# Patient Record
Sex: MALE | Race: BLACK OR AFRICAN AMERICAN | NOT HISPANIC OR LATINO
[De-identification: names, ages, dates, MRNs, and addresses within clinical notes are randomized per-mention and may not be internally consistent; named-entity substitution may affect disease eponyms.]

---

## 2020-11-04 ENCOUNTER — TRANSCRIPTION ENCOUNTER (OUTPATIENT)
Age: 55
End: 2020-11-04

## 2024-02-09 PROBLEM — Z00.00 ENCOUNTER FOR PREVENTIVE HEALTH EXAMINATION: Status: ACTIVE | Noted: 2024-02-09

## 2024-02-16 ENCOUNTER — APPOINTMENT (OUTPATIENT)
Dept: ORTHOPEDIC SURGERY | Facility: CLINIC | Age: 59
End: 2024-02-16
Payer: MEDICAID

## 2024-02-16 VITALS — WEIGHT: 263 LBS | HEIGHT: 69 IN | BODY MASS INDEX: 38.95 KG/M2

## 2024-02-16 DIAGNOSIS — M19.011 PRIMARY OSTEOARTHRITIS, RIGHT SHOULDER: ICD-10-CM

## 2024-02-16 DIAGNOSIS — I10 ESSENTIAL (PRIMARY) HYPERTENSION: ICD-10-CM

## 2024-02-16 PROCEDURE — 99204 OFFICE O/P NEW MOD 45 MIN: CPT

## 2024-02-16 RX ORDER — MELOXICAM 15 MG/1
15 TABLET ORAL
Qty: 30 | Refills: 0 | Status: ACTIVE | COMMUNITY
Start: 2024-02-16 | End: 1900-01-01

## 2024-02-16 RX ORDER — LOSARTAN POTASSIUM 50 MG/1
50 TABLET, FILM COATED ORAL
Refills: 0 | Status: ACTIVE | COMMUNITY

## 2024-02-16 RX ORDER — AMLODIPINE BESYLATE 10 MG/1
10 TABLET ORAL
Refills: 0 | Status: ACTIVE | COMMUNITY

## 2024-02-16 NOTE — DISCUSSION/SUMMARY
[de-identified] : mri r shoulder - eval rct  mobic fu p mri  ----------------------------------------------------------------------------  All relevant imaging studies pertinent to today's visit, including x-rays, MRI's and/or other advanced imaging studies (CT/etc) were independently interpreted and reviewed with the patient as needed. Implications of the studies together with the patient's clinical picture were discussed to formulate a working diagnosis and management options were detailed.  The patient was advised of the diagnosis.  The natural history of the pathology was explained in full. All questions were answered.  The risks and benefits of conservative and interventional treatment alternatives were explained to the patient    -----------------------------------------------  The patient was advised that an advanced diagnostic/imaging study (MRI/CT/etc) will be ordered to evaluate potential pathology in the affected area(s). The patient was further advised to follow up in the office to review the results of the study and determine further management that may be indicated.   -----------------------------------------------  Patient warned of specific risks of medication related to bleeding, GI issues, increase blood pressure, and cardiac risks in addition to additional risks. Patient advised to discuss with PMD if any presence of stated issues.   Progress note completed by Elio Tavarez PA-C working as a scribe for Dr Taylor

## 2024-02-16 NOTE — IMAGING
[Right] : right shoulder [Outside films reviewed] : Outside films reviewed [There are no fractures, subluxations or dislocations. No significant abnormalities are seen] : There are no fractures, subluxations or dislocations. No significant abnormalities are seen [Type 2 acromion] : Type 2 acromion [AC Joint Arthrosis] : AC Joint Arthrosis [Glenohumeral arthritis] : Glenohumeral arthritis [de-identified] :   ----------------------------------------------------------------------------   Right shoulder exam:   Skin: no significant pertinent finding Inspection: no obvious deformity, no obvious masses, no swelling, no effusion, no atrophy ROM:    FF: 180p 130a    ER: 70    IR: T12 Tenderness:    (+) Anterior/Biceps:    (neg) Posterior    (+) Lateral    (+) Trapezius    (neg) Scapula    (neg) AC joint    (neg) Crepitus with ROM Stability:    (neg) Translation    (neg) Apprehension    (neg) Clicking Additional tests:  + drop arm    (+) Neer's    (+)Hawkin's    (neg) Moreland's    (neg) Speed    (neg) Cross chest adduction Strength:    FF: 3/5    ER: 5/5    IR: 5/5    Biceps: 5/5    Triceps: 5/5    Distal: 5/5 Neuro: In tact to light touch throughout Vascularity: Extremity warm and well perfused

## 2024-02-16 NOTE — HISTORY OF PRESENT ILLNESS
[7] : 7 [6] : 6 [Dull/Aching] : dull/aching [Sharp] : sharp [Intermittent] : intermittent [Leisure] : leisure [de-identified] : This is Mr. ANGELO Solano  a 58 year old male who comes in today complaining of right shoulder pain since December when he fell and braced his fall. Pain with overhead activity. Difficulty lifting. Feels weak. No radic sx. [] : no [FreeTextEntry1] : Right Shoulder [FreeTextEntry7] : Neck, chest [de-identified] : PCP [de-identified] : Xrays

## 2024-02-23 ENCOUNTER — APPOINTMENT (OUTPATIENT)
Dept: ORTHOPEDIC SURGERY | Facility: CLINIC | Age: 59
End: 2024-02-23
Payer: MEDICAID

## 2024-02-23 DIAGNOSIS — M75.41 IMPINGEMENT SYNDROME OF RIGHT SHOULDER: ICD-10-CM

## 2024-02-23 DIAGNOSIS — R29.898 OTHER SYMPTOMS AND SIGNS INVOLVING THE MUSCULOSKELETAL SYSTEM: ICD-10-CM

## 2024-02-23 PROCEDURE — 99213 OFFICE O/P EST LOW 20 MIN: CPT

## 2024-02-23 NOTE — HISTORY OF PRESENT ILLNESS
[7] : 7 [6] : 6 [Dull/Aching] : dull/aching [Sharp] : sharp [Intermittent] : intermittent [Leisure] : leisure [de-identified] : This is Mr. ANGELO Solano  a 58 year old male who comes in today complaining of right shoulder pain since December when he fell and braced his fall. Pain with overhead activity. Difficulty lifting. Feels weak. No radic sx. [] : no [FreeTextEntry1] : Right Shoulder [FreeTextEntry7] : Neck, chest [de-identified] : PCP [de-identified] : Xrays

## 2024-02-23 NOTE — IMAGING
[Outside films reviewed] : Outside films reviewed [Right] : right shoulder [There are no fractures, subluxations or dislocations. No significant abnormalities are seen] : There are no fractures, subluxations or dislocations. No significant abnormalities are seen [Type 2 acromion] : Type 2 acromion [AC Joint Arthrosis] : AC Joint Arthrosis [Glenohumeral arthritis] : Glenohumeral arthritis [de-identified] :   ----------------------------------------------------------------------------   Right shoulder exam:   Skin: no significant pertinent finding Inspection: no obvious deformity, no obvious masses, no swelling, no effusion, no atrophy ROM:    FF: 180p 130a    ER: 70    IR: T12 Tenderness:    (+) Anterior/Biceps:    (neg) Posterior    (+) Lateral    (+) Trapezius    (neg) Scapula    (neg) AC joint    (neg) Crepitus with ROM Stability:    (neg) Translation    (neg) Apprehension    (neg) Clicking Additional tests:  + drop arm    (+) Neer's    (+)Hawkin's    (neg) Moreland's    (neg) Speed    (neg) Cross chest adduction Strength:    FF: 3/5    ER: 5/5    IR: 5/5    Biceps: 5/5    Triceps: 5/5    Distal: 5/5 Neuro: In tact to light touch throughout Vascularity: Extremity warm and well perfused

## 2024-02-23 NOTE — DISCUSSION/SUMMARY
[de-identified] : continue to Rec mri r shoulder - eval rct  Patient indicated for MRI due to his persistent weakness following a traumatic injury. Concern for likely large rotator cuff tear, for which surgery may be indicated.  PT rx fu p mri  ----------------------------------------------------------------------------  All relevant imaging studies pertinent to today's visit, including x-rays, MRI's and/or other advanced imaging studies (CT/etc) were independently interpreted and reviewed with the patient as needed. Implications of the studies together with the patient's clinical picture were discussed to formulate a working diagnosis and management options were detailed.  The patient was advised of the diagnosis.  The natural history of the pathology was explained in full. All questions were answered.  The risks and benefits of conservative and interventional treatment alternatives were explained to the patient    -----------------------------------------------  The patient was advised that an advanced diagnostic/imaging study (MRI/CT/etc) will be ordered to evaluate potential pathology in the affected area(s). The patient was further advised to follow up in the office to review the results of the study and determine further management that may be indicated.   -----------------------------------------------  Patient warned of specific risks of medication related to bleeding, GI issues, increase blood pressure, and cardiac risks in addition to additional risks. Patient advised to discuss with PMD if any presence of stated issues.   Progress note completed by Elio Tavarez PA-C working as a scribe for Dr Taylor

## 2024-03-11 ENCOUNTER — APPOINTMENT (OUTPATIENT)
Dept: ORTHOPEDIC SURGERY | Facility: CLINIC | Age: 59
End: 2024-03-11
Payer: MEDICAID

## 2024-03-11 VITALS — HEIGHT: 69 IN | WEIGHT: 263 LBS | BODY MASS INDEX: 38.95 KG/M2

## 2024-03-11 DIAGNOSIS — S46.811A STRAIN OF OTHER MUSCLES, FASCIA AND TENDONS AT SHOULDER AND UPPER ARM LEVEL, RIGHT ARM, INITIAL ENCOUNTER: ICD-10-CM

## 2024-03-11 DIAGNOSIS — M75.101 UNSPECIFIED ROTATOR CUFF TEAR OR RUPTURE OF RIGHT SHOULDER, NOT SPECIFIED AS TRAUMATIC: ICD-10-CM

## 2024-03-11 DIAGNOSIS — M75.21 BICIPITAL TENDINITIS, RIGHT SHOULDER: ICD-10-CM

## 2024-03-11 DIAGNOSIS — M75.121 COMPLETE ROTATOR CUFF TEAR OR RUPTURE OF RIGHT SHOULDER, NOT SPECIFIED AS TRAUMATIC: ICD-10-CM

## 2024-03-11 PROCEDURE — 99214 OFFICE O/P EST MOD 30 MIN: CPT

## 2024-03-11 NOTE — DISCUSSION/SUMMARY
[de-identified] : Reviewed MRI, Discussed and recommend right shoulder arthroscopy Plan for right shoulder scope, RCR, GHD, SAD, subscap repair, biceps tenotomy, possible regeneten patch Discussed possible edumndo, discussed chronicity of tear likely acute on chronic, discussed possibility of failure to heal Discussed RBA ----------------------------------------------------------------------------  The patient was advised of the diagnosis.  The natural history of the pathology was explained in full to the patient. All questions were answered.  The risks and benefits of surgical and non-surgical treatment were explained in full to the patient.  The patient demonstrated a full understanding of the surgical and non-surgical options.  The risks of surgery were outlined in full to the patient including but not limited to pain, stiffness, bleeding, scarring, infection, neurologic injury, vascular injury, failure to resolve symptoms, symptom recurrence, the need for further surgery, non-healing, implant failure, intraoperative fracture, wound breakdown, deep vein thrombosis, pulmonary embolism, anesthesia complications and even death.  The patient understood all the risks and accepted them and understood that other complications could occur that are not mentioned above.  The intraoperative plan, post-operative plan, post-operative expectations and limitations were explained in full.  Importance of postoperative rehabilitation and restriction compliance was explained and emphasized. Expectations from non-surgical treatment were explained in full as well.  The patient demonstrated a complete understanding of the treatment detailed, the risks and alternatives.  ----------------------------------------------------------------------------  All relevant imaging studies pertinent to today's visit, including x-rays, MRI's and/or other advanced imaging studies (CT/etc) were independently interpreted and reviewed with the patient as needed. Implications of the studies together with the patient's clinical picture were discussed to formulate a working diagnosis and management options were detailed.  The patient was advised of the diagnosis.  The natural history of the pathology was explained in full. All questions were answered.  The risks and benefits of conservative and interventional treatment alternatives were explained to the patient

## 2024-03-11 NOTE — IMAGING
[Right] : right shoulder [There are no fractures, subluxations or dislocations. No significant abnormalities are seen] : There are no fractures, subluxations or dislocations. No significant abnormalities are seen [Outside films reviewed] : Outside films reviewed [Type 2 acromion] : Type 2 acromion [AC Joint Arthrosis] : AC Joint Arthrosis [Glenohumeral arthritis] : Glenohumeral arthritis [de-identified] :   ----------------------------------------------------------------------------   Right shoulder exam:   Skin: no significant pertinent finding Inspection: no obvious deformity, no obvious masses, no swelling, no effusion, no atrophy ROM:    FF: 180p 130a    ER: 70    IR: T12 Tenderness:    (+) Anterior/Biceps:    (neg) Posterior    (+) Lateral    (+) Trapezius    (neg) Scapula    (neg) AC joint    (neg) Crepitus with ROM Stability:    (neg) Translation    (neg) Apprehension    (neg) Clicking Additional tests:  + drop arm    (+) Neer's    (+)Hawkin's    (neg) Moreland's    (neg) Speed    (neg) Cross chest adduction Strength:    FF: 3/5    ER: 5/5    IR: 5/5    Biceps: 5/5    Triceps: 5/5    Distal: 5/5 Neuro: In tact to light touch throughout Vascularity: Extremity warm and well perfused

## 2024-03-11 NOTE — HISTORY OF PRESENT ILLNESS
[7] : 7 [6] : 6 [Dull/Aching] : dull/aching [Sharp] : sharp [Intermittent] : intermittent [Leisure] : leisure [de-identified] : This is Mr. ANGELO Solano  a 58 year old male who comes in today complaining of right shoulder pain since December when he fell and braced his fall. Pain with overhead activity. Difficulty lifting. Feels weak. No radic sx.  [] : no [FreeTextEntry7] : Neck, chest [FreeTextEntry1] : Right Shoulder [de-identified] : Xrays [de-identified] : PCP [de-identified] : PT/MRI

## 2024-03-11 NOTE — DATA REVIEWED
[Right] : of the right [MRI] : MRI [I independently reviewed and interpreted images and report] : I independently reviewed and interpreted images and report [Shoulder] : shoulder [FreeTextEntry1] : AC arthrosis, complete supraspinatus tear, retracted to mid humeral head, subscapularis tear, biceps subluxation, mild atrophy of suprapsinatus grade 2

## 2024-05-06 ENCOUNTER — APPOINTMENT (OUTPATIENT)
Dept: ORTHOPEDIC SURGERY | Facility: AMBULATORY SURGERY CENTER | Age: 59
End: 2024-05-06
Payer: MEDICAID

## 2024-05-06 PROCEDURE — 29827 SHO ARTHRS SRG RT8TR CUF RPR: CPT | Mod: RT

## 2024-05-06 PROCEDURE — 29823 SHO ARTHRS SRG XTNSV DBRDMT: CPT | Mod: 59,RT

## 2024-05-06 PROCEDURE — 29999 UNLISTED PX ARTHROSCOPY: CPT | Mod: AS,59,RT

## 2024-05-06 PROCEDURE — 29823 SHO ARTHRS SRG XTNSV DBRDMT: CPT | Mod: AS,59,RT

## 2024-05-06 PROCEDURE — 29826 SHO ARTHRS SRG DECOMPRESSION: CPT | Mod: RT

## 2024-05-06 PROCEDURE — 29999 UNLISTED PX ARTHROSCOPY: CPT | Mod: 59,RT

## 2024-05-06 PROCEDURE — 29826 SHO ARTHRS SRG DECOMPRESSION: CPT | Mod: AS,RT

## 2024-05-06 PROCEDURE — 29827 SHO ARTHRS SRG RT8TR CUF RPR: CPT | Mod: AS,RT

## 2024-05-17 ENCOUNTER — APPOINTMENT (OUTPATIENT)
Dept: ORTHOPEDIC SURGERY | Facility: CLINIC | Age: 59
End: 2024-05-17
Payer: MEDICAID

## 2024-05-17 PROCEDURE — 99024 POSTOP FOLLOW-UP VISIT: CPT

## 2024-05-17 RX ORDER — OXYCODONE AND ACETAMINOPHEN 5; 325 MG/1; MG/1
5-325 TABLET ORAL
Qty: 30 | Refills: 0 | Status: ACTIVE | COMMUNITY
Start: 2024-05-06 | End: 1900-01-01

## 2024-05-17 NOTE — HISTORY OF PRESENT ILLNESS
[7] : 7 [6] : 6 [Dull/Aching] : dull/aching [Sharp] : sharp [Intermittent] : intermittent [Leisure] : leisure [de-identified] : This is Mr. ANGELO Solano  a 58 year old male who comes in today complaining of right shoulder pain since December when he fell and braced his fall. Pain with overhead activity. Difficulty lifting. Feels weak. No radic sx.   5/6/24 S/P right shoulder arthroscopy RCR, SAD, bicep tenotomy and subscapularis repair [] : no [FreeTextEntry1] : Right Shoulder [FreeTextEntry7] : Neck, chest [de-identified] : PCP [de-identified] : Xrays [de-identified] : PT/MRI

## 2024-05-17 NOTE — DISCUSSION/SUMMARY
[de-identified] : PO protocol and activity modifications discussed Reviewed arthroscopic sx photos  Remain in sling  Will wait to start PT f/u in 2 weeks -----------------------------------------------------------------------------   All relevant imaging studies pertinent to today's visit, including x-rays, MRI's and/or other advanced imaging studies (CT/etc) were independently interpreted and reviewed with the patient as needed. Implications of the studies together with the patient's clinical picture were discussed to formulate a working diagnosis and management options were detailed.   The patient and/or guardian was advised of the diagnosis.  The natural history of the pathology was explained in full. All questions were answered.  The risks and benefits of conservative and interventional treatment alternatives were explained to the patient  The patient and/or guardian was advised if any advanced diagnostic/imaging study (MRI/CT/etc) is ordered to evaluate potential pathology in the affected area(s), they should follow up in the office to review the results of the study and determine further management that may be indicated.

## 2024-05-17 NOTE — IMAGING
[Right] : right shoulder [Outside films reviewed] : Outside films reviewed [There are no fractures, subluxations or dislocations. No significant abnormalities are seen] : There are no fractures, subluxations or dislocations. No significant abnormalities are seen [Type 2 acromion] : Type 2 acromion [AC Joint Arthrosis] : AC Joint Arthrosis [Glenohumeral arthritis] : Glenohumeral arthritis [de-identified] : Right shoulder exam: incision CDI mild effusion good rom NVI  sutures removed, steri strips applied

## 2024-05-17 NOTE — DATA REVIEWED
[MRI] : MRI [Right] : of the right [Shoulder] : shoulder [I independently reviewed and interpreted images and report] : I independently reviewed and interpreted images and report [FreeTextEntry1] : AC arthrosis, complete supraspinatus tear, retracted to mid humeral head, subscapularis tear, biceps subluxation, mild atrophy of suprapsinatus grade 2

## 2024-05-17 NOTE — REASON FOR VISIT
[FreeTextEntry2] : 1st po visit: right shoulder Pt has finished out his pain medication and switched to advil but still having discomfort but overall doing good. Has remained in sling

## 2024-05-31 ENCOUNTER — APPOINTMENT (OUTPATIENT)
Dept: ORTHOPEDIC SURGERY | Facility: CLINIC | Age: 59
End: 2024-05-31
Payer: MEDICAID

## 2024-05-31 VITALS — WEIGHT: 263 LBS | HEIGHT: 69 IN | BODY MASS INDEX: 38.95 KG/M2

## 2024-05-31 PROCEDURE — 99024 POSTOP FOLLOW-UP VISIT: CPT

## 2024-05-31 NOTE — REASON FOR VISIT
[FreeTextEntry2] : 2nd po visit: right shoulder- 4 weeks from sx,  pt has remained compliant with sling use.

## 2024-05-31 NOTE — IMAGING
[Right] : right shoulder [Outside films reviewed] : Outside films reviewed [There are no fractures, subluxations or dislocations. No significant abnormalities are seen] : There are no fractures, subluxations or dislocations. No significant abnormalities are seen [Type 2 acromion] : Type 2 acromion [AC Joint Arthrosis] : AC Joint Arthrosis [Glenohumeral arthritis] : Glenohumeral arthritis [de-identified] : Right shoulder exam: incision CDI mild effusion good rom NVI

## 2024-05-31 NOTE — DISCUSSION/SUMMARY
[de-identified] : PO protocol and activity modifications discussed Pt will remain in sling  Plan to start PT - dispensed protocol in office f/u in 4 weeks  -----------------------------------------------------------------------------   All relevant imaging studies pertinent to today's visit, including x-rays, MRI's and/or other advanced imaging studies (CT/etc) were independently interpreted and reviewed with the patient as needed. Implications of the studies together with the patient's clinical picture were discussed to formulate a working diagnosis and management options were detailed.   The patient and/or guardian was advised of the diagnosis.  The natural history of the pathology was explained in full. All questions were answered.  The risks and benefits of conservative and interventional treatment alternatives were explained to the patient  The patient and/or guardian was advised if any advanced diagnostic/imaging study (MRI/CT/etc) is ordered to evaluate potential pathology in the affected area(s), they should follow up in the office to review the results of the study and determine further management that may be indicated.

## 2024-05-31 NOTE — HISTORY OF PRESENT ILLNESS
[4] : 4 [Dull/Aching] : dull/aching [Sharp] : sharp [Intermittent] : intermittent [Leisure] : leisure [de-identified] : This is Mr. ANGELO Solano  a 58 year old male who comes in today complaining of right shoulder pain since December when he fell and braced his fall. Pain with overhead activity. Difficulty lifting. Feels weak. No radic sx.   5/6/24 S/P right shoulder arthroscopy RCR, SAD, bicep tenotomy and subscapularis repair [] : no [FreeTextEntry1] : Right Shoulder [FreeTextEntry7] : Neck, chest [de-identified] : PCP [de-identified] : Xrays [de-identified] : none [de-identified] : right shoulder

## 2024-06-05 ENCOUNTER — APPOINTMENT (OUTPATIENT)
Dept: ORTHOPEDIC SURGERY | Facility: CLINIC | Age: 59
End: 2024-06-05
Payer: MEDICAID

## 2024-06-05 VITALS — HEIGHT: 69 IN | WEIGHT: 263 LBS | BODY MASS INDEX: 38.95 KG/M2

## 2024-06-05 PROCEDURE — 99024 POSTOP FOLLOW-UP VISIT: CPT

## 2024-06-05 RX ORDER — CEPHALEXIN 500 MG/1
500 CAPSULE ORAL 4 TIMES DAILY
Qty: 28 | Refills: 0 | Status: ACTIVE | COMMUNITY
Start: 2024-06-05 | End: 1900-01-01

## 2024-06-05 NOTE — DISCUSSION/SUMMARY
[de-identified] : PO protocol and activity modifications discussed Wound appears to have superficial wound dehiscence plan for antibiotics , irrigation with saline, packing, and f/u in 3 days  Keflex 500 mg QID x 1 wk Pt will remain in sling  Continue PT per protocol f/u in 3 days  -----------------------------------------------------------------------------   All relevant imaging studies pertinent to today's visit, including x-rays, MRI's and/or other advanced imaging studies (CT/etc) were independently interpreted and reviewed with the patient as needed. Implications of the studies together with the patient's clinical picture were discussed to formulate a working diagnosis and management options were detailed.   The patient and/or guardian was advised of the diagnosis.  The natural history of the pathology was explained in full. All questions were answered.  The risks and benefits of conservative and interventional treatment alternatives were explained to the patient  The patient and/or guardian was advised if any advanced diagnostic/imaging study (MRI/CT/etc) is ordered to evaluate potential pathology in the affected area(s), they should follow up in the office to review the results of the study and determine further management that may be indicated.

## 2024-06-05 NOTE — IMAGING
[Right] : right shoulder [Outside films reviewed] : Outside films reviewed [There are no fractures, subluxations or dislocations. No significant abnormalities are seen] : There are no fractures, subluxations or dislocations. No significant abnormalities are seen [Type 2 acromion] : Type 2 acromion [AC Joint Arthrosis] : AC Joint Arthrosis [Glenohumeral arthritis] : Glenohumeral arthritis [de-identified] : Right shoulder exam: healed incisions except  small wound  dehiscence posterior portal , no surrounding erythema, no active drainage, unable to express any puss, wound irrigated with saline and betadine, plan for oral antibiotics min effusion minimal pain with ROM 0-90 degrees, no evidence of short arc pain,  NVI

## 2024-06-05 NOTE — REASON FOR VISIT
[FreeTextEntry2] : 3rd po visit: right shoulder- 4 weeks from sx, having issue with incision. noticed the posterior incision had a scab which fell off yesterday after PT and the one incision is not fully healed. noticed small drainage this morning. No prior drainage/ no drainage since. Feeling well otherwise, no F/C.

## 2024-06-05 NOTE — HISTORY OF PRESENT ILLNESS
[5] : 5 [4] : 4 [Dull/Aching] : dull/aching [Sharp] : sharp [Intermittent] : intermittent [Leisure] : leisure [de-identified] : This is Mr. ANGELO Solano  a 58 year old male who comes in today complaining of right shoulder pain since December when he fell and braced his fall. Pain with overhead activity. Difficulty lifting. Feels weak. No radic sx.   5/6/24 S/P right shoulder arthroscopy RCR, SAD, bicep tenotomy and subscapularis repair [] : no [FreeTextEntry1] : Right Shoulder [FreeTextEntry7] : Neck, chest [de-identified] : PCP [de-identified] : Xrays [de-identified] : physical therapy [de-identified] : right shoulder

## 2024-06-07 ENCOUNTER — APPOINTMENT (OUTPATIENT)
Dept: ORTHOPEDIC SURGERY | Facility: CLINIC | Age: 59
End: 2024-06-07
Payer: MEDICAID

## 2024-06-07 VITALS — BODY MASS INDEX: 38.95 KG/M2 | HEIGHT: 69 IN | WEIGHT: 263 LBS

## 2024-06-07 PROCEDURE — 99024 POSTOP FOLLOW-UP VISIT: CPT

## 2024-06-07 NOTE — HISTORY OF PRESENT ILLNESS
[5] : 5 [4] : 4 [Dull/Aching] : dull/aching [Sharp] : sharp [Intermittent] : intermittent [Leisure] : leisure [de-identified] : This is Mr. ANGELO Solano  a 58 year old male who comes in today complaining of right shoulder pain since December when he fell and braced his fall. Pain with overhead activity. Difficulty lifting. Feels weak. No radic sx.   5/6/24 S/P right shoulder arthroscopy RCR, SAD, bicep tenotomy and subscapularis repair [] : no [FreeTextEntry1] : Right Shoulder [FreeTextEntry7] : Neck, chest [de-identified] : PCP [de-identified] : Xrays [de-identified] : physical therapy [de-identified] : right shoulder

## 2024-06-07 NOTE — DISCUSSION/SUMMARY
[de-identified] : finish ABx dressing change bid  Pt will remain in sling  Continue PT per protocol f/u in 2wks, call if sx worsen  -----------------------------------------------------------------------------   All relevant imaging studies pertinent to today's visit, including x-rays, MRI's and/or other advanced imaging studies (CT/etc) were independently interpreted and reviewed with the patient as needed. Implications of the studies together with the patient's clinical picture were discussed to formulate a working diagnosis and management options were detailed.   The patient and/or guardian was advised of the diagnosis.  The natural history of the pathology was explained in full. All questions were answered.  The risks and benefits of conservative and interventional treatment alternatives were explained to the patient  The patient and/or guardian was advised if any advanced diagnostic/imaging study (MRI/CT/etc) is ordered to evaluate potential pathology in the affected area(s), they should follow up in the office to review the results of the study and determine further management that may be indicated.

## 2024-06-07 NOTE — IMAGING
[Right] : right shoulder [Outside films reviewed] : Outside films reviewed [There are no fractures, subluxations or dislocations. No significant abnormalities are seen] : There are no fractures, subluxations or dislocations. No significant abnormalities are seen [Type 2 acromion] : Type 2 acromion [AC Joint Arthrosis] : AC Joint Arthrosis [Glenohumeral arthritis] : Glenohumeral arthritis [de-identified] : Right shoulder exam: healed incisions except  small wound  dehiscence posterior portal , no surrounding erythema, no active drainage, unable to express any puss, wound irrigated with saline and betadine, plan for oral antibiotics min effusion minimal pain with ROM 0-90 degrees, no evidence of short arc pain,  NVI

## 2024-06-21 ENCOUNTER — APPOINTMENT (OUTPATIENT)
Dept: ORTHOPEDIC SURGERY | Facility: CLINIC | Age: 59
End: 2024-06-21
Payer: MEDICAID

## 2024-06-21 VITALS — WEIGHT: 263 LBS | BODY MASS INDEX: 38.95 KG/M2 | HEIGHT: 69 IN

## 2024-06-21 DIAGNOSIS — Z98.890 OTHER SPECIFIED POSTPROCEDURAL STATES: ICD-10-CM

## 2024-06-21 PROCEDURE — 99024 POSTOP FOLLOW-UP VISIT: CPT

## 2024-06-21 NOTE — HISTORY OF PRESENT ILLNESS
[3] : 3 [Dull/Aching] : dull/aching [Sharp] : sharp [Intermittent] : intermittent [Leisure] : leisure [de-identified] : This is Mr. ANGELO Solano  a 58 year old male who comes in today complaining of right shoulder pain since December when he fell and braced his fall. Pain with overhead activity. Difficulty lifting. Feels weak. No radic sx.   5/6/24 S/P right shoulder arthroscopy RCR, SAD, bicep tenotomy and subscapularis repair [] : no [FreeTextEntry1] : Right Shoulder [FreeTextEntry7] : Neck, chest [de-identified] : PCP [de-identified] : Xrays [de-identified] : physical therapy [de-identified] : right shoulder

## 2024-06-21 NOTE — DISCUSSION/SUMMARY
[de-identified] : Monitored incision and dressing sterilely changed in office  Advised on wound care and healing  Pt can slowly wean from sling and can discontinue use of sling next week Continue PT per protocol f/u 6 weeks   -----------------------------------------------------------------------------   All relevant imaging studies pertinent to today's visit, including x-rays, MRI's and/or other advanced imaging studies (CT/etc) were independently interpreted and reviewed with the patient as needed. Implications of the studies together with the patient's clinical picture were discussed to formulate a working diagnosis and management options were detailed.   The patient and/or guardian was advised of the diagnosis.  The natural history of the pathology was explained in full. All questions were answered.  The risks and benefits of conservative and interventional treatment alternatives were explained to the patient  The patient and/or guardian was advised if any advanced diagnostic/imaging study (MRI/CT/etc) is ordered to evaluate potential pathology in the affected area(s), they should follow up in the office to review the results of the study and determine further management that may be indicated.

## 2024-06-21 NOTE — IMAGING
[Right] : right shoulder [Outside films reviewed] : Outside films reviewed [There are no fractures, subluxations or dislocations. No significant abnormalities are seen] : There are no fractures, subluxations or dislocations. No significant abnormalities are seen [Type 2 acromion] : Type 2 acromion [AC Joint Arthrosis] : AC Joint Arthrosis [Glenohumeral arthritis] : Glenohumeral arthritis [de-identified] : Right shoulder exam: small opening decreased from previous visit  clean and no fluid extravagation min effusion minimal pain with ROM 0-90 degrees, no evidence of short arc pain,  NVI

## 2024-08-02 ENCOUNTER — APPOINTMENT (OUTPATIENT)
Dept: ORTHOPEDIC SURGERY | Facility: CLINIC | Age: 59
End: 2024-08-02
Payer: COMMERCIAL

## 2024-08-02 VITALS — WEIGHT: 263 LBS | BODY MASS INDEX: 38.95 KG/M2 | HEIGHT: 69 IN

## 2024-08-02 DIAGNOSIS — Z98.890 OTHER SPECIFIED POSTPROCEDURAL STATES: ICD-10-CM

## 2024-08-02 PROCEDURE — 99024 POSTOP FOLLOW-UP VISIT: CPT

## 2024-08-02 NOTE — IMAGING
[Right] : right shoulder [Outside films reviewed] : Outside films reviewed [There are no fractures, subluxations or dislocations. No significant abnormalities are seen] : There are no fractures, subluxations or dislocations. No significant abnormalities are seen [Type 2 acromion] : Type 2 acromion [AC Joint Arthrosis] : AC Joint Arthrosis [Glenohumeral arthritis] : Glenohumeral arthritis [de-identified] : Right shoulder exam: wound healed healed incisions no effusion FF 130a  ER 50 strength:5-/5 in all planes NVID     ----------------------------------------------------------------------------

## 2024-08-02 NOTE — DISCUSSION/SUMMARY
[de-identified] : Discussed continued PO protocol and activity modifications Continue PT  Pt should avoid lifting, pushing, pulling with this arm f/u 2 months   -----------------------------------------------------------------------------   All relevant imaging studies pertinent to today's visit, including x-rays, MRI's and/or other advanced imaging studies (CT/etc) were independently interpreted and reviewed with the patient as needed. Implications of the studies together with the patient's clinical picture were discussed to formulate a working diagnosis and management options were detailed.   The patient and/or guardian was advised of the diagnosis.  The natural history of the pathology was explained in full. All questions were answered.  The risks and benefits of conservative and interventional treatment alternatives were explained to the patient  The patient and/or guardian was advised if any advanced diagnostic/imaging study (MRI/CT/etc) is ordered to evaluate potential pathology in the affected area(s), they should follow up in the office to review the results of the study and determine further management that may be indicated.

## 2024-08-02 NOTE — HISTORY OF PRESENT ILLNESS
[2] : 2 [Dull/Aching] : dull/aching [Intermittent] : intermittent [Ice] : ice [de-identified] : This is Mr. ANGELO Solano  a 58 year old male who comes in today complaining of right shoulder pain since December when he fell and braced his fall. Pain with overhead activity. Difficulty lifting. Feels weak. No radic sx.   5/6/24 S/P right shoulder arthroscopy RCR, SAD, bicep tenotomy and subscapularis repair [] : no [FreeTextEntry1] : Right Shoulder [de-identified] : PCP [de-identified] : physical therapy [de-identified] : Xrays [de-identified] : right shoulder

## 2024-10-04 ENCOUNTER — APPOINTMENT (OUTPATIENT)
Dept: ORTHOPEDIC SURGERY | Facility: CLINIC | Age: 59
End: 2024-10-04
Payer: COMMERCIAL

## 2024-10-04 VITALS — HEIGHT: 69 IN | WEIGHT: 263 LBS | BODY MASS INDEX: 38.95 KG/M2

## 2024-10-04 DIAGNOSIS — Z98.890 OTHER SPECIFIED POSTPROCEDURAL STATES: ICD-10-CM

## 2024-10-04 PROCEDURE — 99213 OFFICE O/P EST LOW 20 MIN: CPT

## 2024-10-04 NOTE — DISCUSSION/SUMMARY
[de-identified] : Plan for continued PT to work on strengthening  f/u in 3 months -----------------------------------------------------------------------------   All relevant imaging studies pertinent to today's visit, including x-rays, MRI's and/or other advanced imaging studies (CT/etc) were independently interpreted and reviewed with the patient as needed. Implications of the studies together with the patient's clinical picture were discussed to formulate a working diagnosis and management options were detailed.   The patient and/or guardian was advised of the diagnosis.  The natural history of the pathology was explained in full. All questions were answered.  The risks and benefits of conservative and interventional treatment alternatives were explained to the patient  The patient and/or guardian was advised if any advanced diagnostic/imaging study (MRI/CT/etc) is ordered to evaluate potential pathology in the affected area(s), they should follow up in the office to review the results of the study and determine further management that may be indicated.

## 2024-10-04 NOTE — IMAGING
[Right] : right shoulder [Outside films reviewed] : Outside films reviewed [There are no fractures, subluxations or dislocations. No significant abnormalities are seen] : There are no fractures, subluxations or dislocations. No significant abnormalities are seen [Type 2 acromion] : Type 2 acromion [AC Joint Arthrosis] : AC Joint Arthrosis [Glenohumeral arthritis] : Glenohumeral arthritis [de-identified] : Right shoulder exam: wound healed healed incisions no effusion FF 160a 170p ER 50 IR S1 strength: 5/5  NV[facility]     ----------------------------------------------------------------------------

## 2024-10-04 NOTE — HISTORY OF PRESENT ILLNESS
[2] : 2 [3] : 3 [Dull/Aching] : dull/aching [Intermittent] : intermittent [Ice] : ice [de-identified] : This is Mr. ANGELO Solano  a 58 year old male who comes in today complaining of right shoulder pain since December when he fell and braced his fall. Pain with overhead activity. Difficulty lifting. Feels weak. No radic sx.   5/6/24 S/P right shoulder arthroscopy RCR, SAD, bicep tenotomy and subscapularis repair [] : no [FreeTextEntry1] : Right Shoulder [de-identified] : PCP [de-identified] : Xrays [de-identified] : physical therapy [de-identified] : right shoulder

## 2024-10-04 NOTE — REASON FOR VISIT
[FreeTextEntry2] : follow up right shoulder. Doing well in PT. occasional pain at night when he sleeps on shoulder.  dos: 5/6/24

## 2024-12-30 ENCOUNTER — APPOINTMENT (OUTPATIENT)
Dept: ORTHOPEDIC SURGERY | Facility: CLINIC | Age: 59
End: 2024-12-30
Payer: COMMERCIAL

## 2024-12-30 DIAGNOSIS — Z98.890 OTHER SPECIFIED POSTPROCEDURAL STATES: ICD-10-CM

## 2024-12-30 PROCEDURE — 99212 OFFICE O/P EST SF 10 MIN: CPT

## 2025-03-27 ENCOUNTER — APPOINTMENT (OUTPATIENT)
Dept: ORTHOPEDIC SURGERY | Facility: CLINIC | Age: 60
End: 2025-03-27
Payer: MEDICAID

## 2025-03-27 DIAGNOSIS — M54.12 RADICULOPATHY, CERVICAL REGION: ICD-10-CM

## 2025-03-27 PROCEDURE — 99214 OFFICE O/P EST MOD 30 MIN: CPT

## 2025-03-27 PROCEDURE — 72040 X-RAY EXAM NECK SPINE 2-3 VW: CPT

## 2025-03-27 RX ORDER — METHYLPREDNISOLONE 4 MG/1
4 TABLET ORAL
Qty: 1 | Refills: 0 | Status: ACTIVE | COMMUNITY
Start: 2025-03-27 | End: 1900-01-01

## 2025-05-29 ENCOUNTER — APPOINTMENT (OUTPATIENT)
Dept: ORTHOPEDIC SURGERY | Facility: CLINIC | Age: 60
End: 2025-05-29
Payer: MEDICAID

## 2025-05-29 DIAGNOSIS — R29.898 OTHER SYMPTOMS AND SIGNS INVOLVING THE MUSCULOSKELETAL SYSTEM: ICD-10-CM

## 2025-05-29 DIAGNOSIS — M54.12 RADICULOPATHY, CERVICAL REGION: ICD-10-CM

## 2025-05-29 PROCEDURE — 99214 OFFICE O/P EST MOD 30 MIN: CPT

## 2025-05-29 RX ORDER — GABAPENTIN 300 MG/1
300 CAPSULE ORAL
Qty: 30 | Refills: 0 | Status: ACTIVE | COMMUNITY
Start: 2025-05-29 | End: 1900-01-01

## 2025-05-29 RX ORDER — MELOXICAM 15 MG/1
15 TABLET ORAL
Qty: 30 | Refills: 1 | Status: ACTIVE | COMMUNITY
Start: 2025-05-29 | End: 1900-01-01

## 2025-06-16 ENCOUNTER — APPOINTMENT (OUTPATIENT)
Dept: PAIN MANAGEMENT | Facility: CLINIC | Age: 60
End: 2025-06-16

## 2025-06-30 ENCOUNTER — APPOINTMENT (OUTPATIENT)
Dept: PAIN MANAGEMENT | Facility: CLINIC | Age: 60
End: 2025-06-30
Payer: MEDICAID

## 2025-06-30 VITALS — HEIGHT: 66 IN | WEIGHT: 277 LBS | BODY MASS INDEX: 44.52 KG/M2

## 2025-06-30 PROCEDURE — 99204 OFFICE O/P NEW MOD 45 MIN: CPT

## 2025-06-30 RX ORDER — CYCLOBENZAPRINE HYDROCHLORIDE 5 MG/1
5 TABLET, FILM COATED ORAL TWICE DAILY
Qty: 60 | Refills: 1 | Status: ACTIVE | COMMUNITY
Start: 2025-06-30 | End: 1900-01-01

## 2025-07-22 ENCOUNTER — APPOINTMENT (OUTPATIENT)
Dept: PAIN MANAGEMENT | Facility: CLINIC | Age: 60
End: 2025-07-22
Payer: MEDICAID

## 2025-07-22 PROCEDURE — 62321 NJX INTERLAMINAR CRV/THRC: CPT

## 2025-08-05 ENCOUNTER — APPOINTMENT (OUTPATIENT)
Dept: PAIN MANAGEMENT | Facility: CLINIC | Age: 60
End: 2025-08-05
Payer: MEDICAID

## 2025-08-05 VITALS — WEIGHT: 277 LBS | HEIGHT: 66 IN | BODY MASS INDEX: 44.52 KG/M2

## 2025-08-05 DIAGNOSIS — M54.12 RADICULOPATHY, CERVICAL REGION: ICD-10-CM

## 2025-08-05 PROCEDURE — 99214 OFFICE O/P EST MOD 30 MIN: CPT
